# Patient Record
Sex: FEMALE | Race: WHITE | URBAN - NONMETROPOLITAN AREA
[De-identification: names, ages, dates, MRNs, and addresses within clinical notes are randomized per-mention and may not be internally consistent; named-entity substitution may affect disease eponyms.]

---

## 2020-07-16 ENCOUNTER — OFFICE VISIT (OUTPATIENT)
Dept: URBAN - NONMETROPOLITAN AREA CLINIC 4 | Facility: CLINIC | Age: 80
End: 2020-07-16

## 2020-08-03 ENCOUNTER — OFFICE VISIT (OUTPATIENT)
Dept: URBAN - METROPOLITAN AREA CLINIC 54 | Facility: CLINIC | Age: 80
End: 2020-08-03
Payer: COMMERCIAL

## 2020-08-03 ENCOUNTER — WEB ENCOUNTER (OUTPATIENT)
Dept: URBAN - METROPOLITAN AREA CLINIC 54 | Facility: CLINIC | Age: 80
End: 2020-08-03

## 2020-08-03 DIAGNOSIS — K21.9 GERD: ICD-10-CM

## 2020-08-03 DIAGNOSIS — Z86.010 PERSONAL HISTORY OF COLONIC POLYPS: ICD-10-CM

## 2020-08-03 DIAGNOSIS — R10.84 ABDOMINAL CRAMPING, GENERALIZED: ICD-10-CM

## 2020-08-03 PROBLEM — 83132003 UPPER ABDOMINAL PAIN: Status: ACTIVE | Noted: 2020-08-03

## 2020-08-03 PROCEDURE — 99214 OFFICE O/P EST MOD 30 MIN: CPT | Performed by: INTERNAL MEDICINE

## 2020-08-03 RX ORDER — ZOLPIDEM TARTRATE 5 MG/1
TABLET, FILM COATED ORAL
Qty: 0 | Refills: 0 | Status: ACTIVE | COMMUNITY
Start: 1900-01-01

## 2020-08-03 RX ORDER — CIMETIDINE 300 MG/1
1 TABLET WITH MEALS AND AT BEDTIME TABLET, FILM COATED ORAL TWICE A DAY
Qty: 60 | Refills: 2 | OUTPATIENT
Start: 2020-08-03

## 2020-08-03 NOTE — HPI-TODAY'S VISIT:
Pt reports that she has had trouble since March. For approx 3 weeks terrible indigestion and rumbling in chest. Chest discomfort with loss of 10# in 2 weeks.  Pt reports that she called the family doctor and was placed on pantoprazole.  Pt reports that she took otc omeprazole. Pt reports that this did seem to help but sx returns.  Pt report that she got another rx and started again. Pt reports that she stopped the meds d/t aching. Pt reports that she has regurg and water brash

## 2020-08-20 ENCOUNTER — TELEPHONE ENCOUNTER (OUTPATIENT)
Dept: URBAN - METROPOLITAN AREA CLINIC 54 | Facility: CLINIC | Age: 80
End: 2020-08-20

## 2020-08-21 ENCOUNTER — WEB ENCOUNTER (OUTPATIENT)
Dept: URBAN - NONMETROPOLITAN AREA CLINIC 4 | Facility: CLINIC | Age: 80
End: 2020-08-21

## 2020-08-24 ENCOUNTER — WEB ENCOUNTER (OUTPATIENT)
Dept: URBAN - METROPOLITAN AREA CLINIC 54 | Facility: CLINIC | Age: 80
End: 2020-08-24

## 2020-09-14 ENCOUNTER — OFFICE VISIT (OUTPATIENT)
Dept: URBAN - METROPOLITAN AREA CLINIC 54 | Facility: CLINIC | Age: 80
End: 2020-09-14
Payer: COMMERCIAL

## 2020-09-14 DIAGNOSIS — K63.5 COLON POLYPS: ICD-10-CM

## 2020-09-14 DIAGNOSIS — K21.9 GERD: ICD-10-CM

## 2020-09-14 DIAGNOSIS — R19.7 DIARRHEA: ICD-10-CM

## 2020-09-14 PROCEDURE — G8427 DOCREV CUR MEDS BY ELIG CLIN: HCPCS | Performed by: INTERNAL MEDICINE

## 2020-09-14 PROCEDURE — 1036F TOBACCO NON-USER: CPT | Performed by: INTERNAL MEDICINE

## 2020-09-14 PROCEDURE — 99214 OFFICE O/P EST MOD 30 MIN: CPT | Performed by: INTERNAL MEDICINE

## 2020-09-14 PROCEDURE — G9903 PT SCRN TBCO ID AS NON USER: HCPCS | Performed by: INTERNAL MEDICINE

## 2020-09-14 PROCEDURE — G8417 CALC BMI ABV UP PARAM F/U: HCPCS | Performed by: INTERNAL MEDICINE

## 2020-09-14 RX ORDER — COLESEVELAM HYDROCHLORIDE 625 MG/1
3 TABLETS WITH MEALS TABLET, COATED ORAL TWICE A DAY
Status: ACTIVE | COMMUNITY

## 2020-09-14 RX ORDER — CIMETIDINE 300 MG/1
1 TABLET WITH MEALS AND AT BEDTIME TABLET, FILM COATED ORAL TWICE A DAY
Qty: 60 | Refills: 2 | Status: ACTIVE | COMMUNITY
Start: 2020-08-03

## 2020-09-14 RX ORDER — NITROGLYCERIN 0.3 MG/1
AS DIRECTED PRN TABLET SUBLINGUAL ONCE A DAY
Qty: 30 | Refills: 2 | OUTPATIENT

## 2020-09-14 RX ORDER — ZOLPIDEM TARTRATE 5 MG/1
TABLET, FILM COATED ORAL
Qty: 0 | Refills: 0 | Status: DISCONTINUED | COMMUNITY
Start: 1900-01-01

## 2020-09-14 RX ORDER — SUCRALFATE 1 G
1 TABLET PRN TABLET ORAL TWICE A DAY
Qty: 60 TABLET | Refills: 1 | OUTPATIENT

## 2020-09-14 NOTE — HPI-TODAY'S VISIT:
Last visit Pt reports that she has had trouble since March. For approx 3 weeks terrible indigestion and rumbling in chest. Chest discomfort with loss of 10# in 2 weeks.  Pt reports that she called the family doctor and was placed on pantoprazole.  Pt reports that she took otc omeprazole. Pt reports that this did seem to help but sx returns.  Pt report that she got another rx and started again. Pt reports that she stopped the meds d/t aching. Pt reports that she has regurg and water brash  Currently Returns for f/u visit. Pt with severe dysphagia acute after HIDA scan. Painful ches. HIDA with normal EF and no reproduction of symptoms. States fear over recurrenr sx. Also with hx of crc and polyps. c/o nocturnal diarrhea. Neg stool studies in 2019. +tenesmus.

## 2020-09-25 ENCOUNTER — OFFICE VISIT (OUTPATIENT)
Dept: URBAN - METROPOLITAN AREA SURGERY CENTER 14 | Facility: SURGERY CENTER | Age: 80
End: 2020-09-25
Payer: COMMERCIAL

## 2020-09-25 ENCOUNTER — CLAIMS CREATED FROM THE CLAIM WINDOW (OUTPATIENT)
Dept: URBAN - METROPOLITAN AREA CLINIC 4 | Facility: CLINIC | Age: 80
End: 2020-09-25
Payer: COMMERCIAL

## 2020-09-25 DIAGNOSIS — D12.4 ADENOMA OF DESCENDING COLON: ICD-10-CM

## 2020-09-25 DIAGNOSIS — D12.4 BENIGN NEOPLASM OF DESCENDING COLON: ICD-10-CM

## 2020-09-25 DIAGNOSIS — Z86.010 H/O ADENOMATOUS POLYP OF COLON: ICD-10-CM

## 2020-09-25 DIAGNOSIS — K31.89 ACQUIRED DEFORMITY OF DUODENUM: ICD-10-CM

## 2020-09-25 DIAGNOSIS — D12.3 BENIGN NEOPLASM OF TRANSVERSE COLON: ICD-10-CM

## 2020-09-25 DIAGNOSIS — K63.89 OTHER SPECIFIED DISEASES OF INTESTINE: ICD-10-CM

## 2020-09-25 DIAGNOSIS — K29.60 OTHER GASTRITIS WITHOUT BLEEDING: ICD-10-CM

## 2020-09-25 DIAGNOSIS — R10.10 ABDOMINAL WALL PAIN IN RIGHT UPPER QUADRANT: ICD-10-CM

## 2020-09-25 DIAGNOSIS — Z85.038 H/O COLON CANCER, STAGE I: ICD-10-CM

## 2020-09-25 DIAGNOSIS — D12.3 ADENOMA OF TRANSVERSE COLON: ICD-10-CM

## 2020-09-25 PROCEDURE — G9936 PMH PLYP/NEO CO/RECT/JUN/ANS: HCPCS | Performed by: INTERNAL MEDICINE

## 2020-09-25 PROCEDURE — 88305 TISSUE EXAM BY PATHOLOGIST: CPT | Performed by: PATHOLOGY

## 2020-09-25 PROCEDURE — G8907 PT DOC NO EVENTS ON DISCHARG: HCPCS | Performed by: INTERNAL MEDICINE

## 2020-09-25 PROCEDURE — 45385 COLONOSCOPY W/LESION REMOVAL: CPT | Performed by: INTERNAL MEDICINE

## 2020-09-25 PROCEDURE — 88312 SPECIAL STAINS GROUP 1: CPT | Performed by: PATHOLOGY

## 2020-09-25 PROCEDURE — 43239 EGD BIOPSY SINGLE/MULTIPLE: CPT | Performed by: INTERNAL MEDICINE

## 2020-11-09 ENCOUNTER — TELEPHONE ENCOUNTER (OUTPATIENT)
Dept: URBAN - METROPOLITAN AREA CLINIC 54 | Facility: CLINIC | Age: 80
End: 2020-11-09

## 2020-11-10 ENCOUNTER — ERX REFILL RESPONSE (OUTPATIENT)
Dept: URBAN - METROPOLITAN AREA CLINIC 54 | Facility: CLINIC | Age: 80
End: 2020-11-10

## 2020-11-10 RX ORDER — CIMETIDINE 300 MG/1
1 TABLET WITH MEALS AND AT BEDTIME TABLET, FILM COATED ORAL TWICE A DAY
Qty: 180 | Refills: 2

## 2020-12-03 ENCOUNTER — OFFICE VISIT (OUTPATIENT)
Dept: URBAN - NONMETROPOLITAN AREA CLINIC 4 | Facility: CLINIC | Age: 80
End: 2020-12-03
Payer: COMMERCIAL

## 2020-12-03 DIAGNOSIS — K63.5 COLON POLYPS: ICD-10-CM

## 2020-12-03 DIAGNOSIS — R19.7 DIARRHEA: ICD-10-CM

## 2020-12-03 DIAGNOSIS — K21.9 GERD: ICD-10-CM

## 2020-12-03 PROCEDURE — G8417 CALC BMI ABV UP PARAM F/U: HCPCS | Performed by: INTERNAL MEDICINE

## 2020-12-03 PROCEDURE — 1036F TOBACCO NON-USER: CPT | Performed by: INTERNAL MEDICINE

## 2020-12-03 PROCEDURE — G9903 PT SCRN TBCO ID AS NON USER: HCPCS | Performed by: INTERNAL MEDICINE

## 2020-12-03 PROCEDURE — G8427 DOCREV CUR MEDS BY ELIG CLIN: HCPCS | Performed by: INTERNAL MEDICINE

## 2020-12-03 PROCEDURE — G8482 FLU IMMUNIZE ORDER/ADMIN: HCPCS | Performed by: INTERNAL MEDICINE

## 2020-12-03 PROCEDURE — 99213 OFFICE O/P EST LOW 20 MIN: CPT | Performed by: INTERNAL MEDICINE

## 2020-12-03 RX ORDER — SUCRALFATE 1 G
1 TABLET PRN TABLET ORAL TWICE A DAY
Qty: 60 TABLET | Refills: 1 | Status: ACTIVE | COMMUNITY

## 2020-12-03 RX ORDER — CIMETIDINE 300 MG/1
1 TABLET WITH MEALS AND AT BEDTIME TABLET, FILM COATED ORAL TWICE A DAY
Qty: 180 | Refills: 2 | Status: ACTIVE | COMMUNITY

## 2020-12-03 RX ORDER — COLESEVELAM HYDROCHLORIDE 625 MG/1
3 TABLETS WITH MEALS TABLET, COATED ORAL TWICE A DAY
Status: ACTIVE | COMMUNITY

## 2020-12-03 RX ORDER — BISMUTH SUBSALICYLATE 262 MG/1
2 TABLETS WITH MEALS AND AT BEDTIME TABLET, CHEWABLE ORAL
Qty: 240 TABLET | Refills: 1 | OUTPATIENT

## 2020-12-03 RX ORDER — NITROGLYCERIN 0.3 MG/1
AS DIRECTED PRN TABLET SUBLINGUAL ONCE A DAY
Qty: 30 | Refills: 2 | Status: ACTIVE | COMMUNITY

## 2020-12-03 NOTE — HPI-TODAY'S VISIT:
81-year-old female returns for follow-up visit.  The patient underwent a colonoscopy 9/25/2020.  This was due to a personal history of colon cancer.  She was found to have a 4 mm polyp in the rectum for sessile polyps in the descending colon 2 polyps in the descending colon for additional polyps in the transverse colon as well as pandiverticulosis.  He also underwent a upper endoscopy as she complained of upper abdominal pain and was found to have a J-shaped stomach with mild axial rotation a HIDA scan was performed for the upper abdominal discomfort and the gallbladder ejection fraction was 40% without reproduction of symptoms.  Pathology from the upper endoscopy was consistent with predominantly tubular adenomatous polyps with benign mucosal polyp in the rectum and additional hyperplastic polyps in the descending colon.  The patient reports that she has been doing well.  She reports occasional bowel incontinence but otherwise no new complaints.  Abdominal discomfort has improved.  Has some trouble with diarrhea and incontinence

## 2020-12-10 ENCOUNTER — ERX REFILL RESPONSE (OUTPATIENT)
Age: 80
End: 2020-12-10

## 2020-12-10 RX ORDER — COLESEVELAM HYDROCHLORIDE 625 MG/1
TAKE 3 TABLETS BY MOUTH TWO TIMES DAILY WITH MEALS AND  LIQUID TABLET, FILM COATED ORAL
Qty: 540 | Refills: 3

## 2021-02-04 ENCOUNTER — OFFICE VISIT (OUTPATIENT)
Dept: URBAN - NONMETROPOLITAN AREA CLINIC 4 | Facility: CLINIC | Age: 81
End: 2021-02-04
Payer: COMMERCIAL

## 2021-02-04 DIAGNOSIS — K63.5 COLON POLYPS: ICD-10-CM

## 2021-02-04 DIAGNOSIS — K58.9 IBS (IRRITABLE BOWEL SYNDROME) DIARRHEA PREDOMINANT: ICD-10-CM

## 2021-02-04 DIAGNOSIS — K21.9 GERD: ICD-10-CM

## 2021-02-04 DIAGNOSIS — Z85.038 PERSONAL HISTORY OF COLON CANCER: ICD-10-CM

## 2021-02-04 PROCEDURE — 99214 OFFICE O/P EST MOD 30 MIN: CPT | Performed by: INTERNAL MEDICINE

## 2021-02-04 PROCEDURE — G9903 PT SCRN TBCO ID AS NON USER: HCPCS | Performed by: INTERNAL MEDICINE

## 2021-02-04 PROCEDURE — G8483 FLU IMM NO ADMIN DOC REA: HCPCS | Performed by: INTERNAL MEDICINE

## 2021-02-04 PROCEDURE — 1036F TOBACCO NON-USER: CPT | Performed by: INTERNAL MEDICINE

## 2021-02-04 PROCEDURE — G8417 CALC BMI ABV UP PARAM F/U: HCPCS | Performed by: INTERNAL MEDICINE

## 2021-02-04 RX ORDER — BISMUTH SUBSALICYLATE 262 MG/1
2 TABLETS WITH MEALS AND AT BEDTIME TABLET, CHEWABLE ORAL
Qty: 240 TABLET | Refills: 1 | Status: ACTIVE | COMMUNITY

## 2021-02-04 RX ORDER — CIMETIDINE 300 MG/1
1 TABLET WITH MEALS AND AT BEDTIME TABLET, FILM COATED ORAL TWICE A DAY
Qty: 180 | Refills: 2 | Status: ACTIVE | COMMUNITY

## 2021-02-04 RX ORDER — SUCRALFATE 1 G
1 TABLET PRN TABLET ORAL TWICE A DAY
Qty: 60 TABLET | Refills: 1 | Status: ACTIVE | COMMUNITY

## 2021-02-04 RX ORDER — NITROGLYCERIN 0.3 MG/1
AS DIRECTED PRN TABLET SUBLINGUAL ONCE A DAY
Qty: 30 | Refills: 2 | Status: ACTIVE | COMMUNITY

## 2021-02-04 RX ORDER — COLESEVELAM HYDROCHLORIDE 625 MG/1
TAKE 3 TABLETS BY MOUTH TWO TIMES DAILY WITH MEALS AND  LIQUID TABLET, FILM COATED ORAL
Qty: 540 | Refills: 3 | Status: ACTIVE | COMMUNITY

## 2021-02-04 RX ORDER — DEXLANSOPRAZOLE 60 MG/1
1 CAPSULE CAPSULE, DELAYED RELEASE ORAL ONCE A DAY
Qty: 90 CAPSULE | Refills: 2 | OUTPATIENT

## 2021-02-04 RX ORDER — COLESEVELAM HYDROCHLORIDE 625 MG/1
3 TABLETS WITH MEALS TABLET, COATED ORAL TWICE A DAY
Status: ACTIVE | COMMUNITY

## 2021-02-04 NOTE — HPI-TODAY'S VISIT:
Pt reports that she is having some trouble with sudden diarrhea.  Pt reports that she is taking the welchol.  Pt report sthat she is taking 2 per day.  Pt reports that she will have 2- 3 formed stools.  Then has suddent onset diarrhea.  Pt reprot that taking the dexilant that did not have reflux or diarrhea.

## 2021-04-22 ENCOUNTER — TELEPHONE ENCOUNTER (OUTPATIENT)
Dept: URBAN - METROPOLITAN AREA CLINIC 92 | Facility: CLINIC | Age: 81
End: 2021-04-22

## 2021-04-22 RX ORDER — DEXLANSOPRAZOLE 60 MG/1
1 CAPSULE CAPSULE, DELAYED RELEASE ORAL ONCE A DAY
Qty: 90 CAPSULE | Refills: 2

## 2021-10-22 ENCOUNTER — TELEPHONE ENCOUNTER (OUTPATIENT)
Dept: URBAN - METROPOLITAN AREA CLINIC 92 | Facility: CLINIC | Age: 81
End: 2021-10-22

## 2021-10-22 RX ORDER — OMEPRAZOLE 20 MG/1
1 CAPSULE 30 MINUTES BEFORE MORNING MEAL CAPSULE, DELAYED RELEASE ORAL ONCE A DAY
Qty: 30 | Refills: 6 | OUTPATIENT

## 2021-10-28 ENCOUNTER — WEB ENCOUNTER (OUTPATIENT)
Dept: URBAN - METROPOLITAN AREA CLINIC 54 | Facility: CLINIC | Age: 81
End: 2021-10-28

## 2021-10-28 ENCOUNTER — OFFICE VISIT (OUTPATIENT)
Dept: URBAN - METROPOLITAN AREA CLINIC 54 | Facility: CLINIC | Age: 81
End: 2021-10-28
Payer: COMMERCIAL

## 2021-10-28 VITALS
DIASTOLIC BLOOD PRESSURE: 84 MMHG | SYSTOLIC BLOOD PRESSURE: 169 MMHG | HEIGHT: 66 IN | BODY MASS INDEX: 25.07 KG/M2 | TEMPERATURE: 97.9 F | WEIGHT: 156 LBS | HEART RATE: 83 BPM

## 2021-10-28 DIAGNOSIS — K63.5 COLON POLYPS: ICD-10-CM

## 2021-10-28 DIAGNOSIS — Z85.038 PERSONAL HISTORY OF COLON CANCER: ICD-10-CM

## 2021-10-28 DIAGNOSIS — K58.9 IBS (IRRITABLE BOWEL SYNDROME) DIARRHEA PREDOMINANT: ICD-10-CM

## 2021-10-28 DIAGNOSIS — R11.0 NAUSEA: ICD-10-CM

## 2021-10-28 DIAGNOSIS — R10.32 LLQ PAIN: ICD-10-CM

## 2021-10-28 DIAGNOSIS — K21.9 GERD: ICD-10-CM

## 2021-10-28 PROCEDURE — 99214 OFFICE O/P EST MOD 30 MIN: CPT | Performed by: REGISTERED NURSE

## 2021-10-28 RX ORDER — ONDANSETRON 4 MG/1
1 TABLET ON THE TONGUE AND ALLOW TO DISSOLVE TABLET, ORALLY DISINTEGRATING ORAL ONCE A DAY
Status: ACTIVE | COMMUNITY

## 2021-10-28 RX ORDER — OMEPRAZOLE 20 MG/1
1 CAPSULE 30 MINUTES BEFORE MORNING MEAL CAPSULE, DELAYED RELEASE ORAL ONCE A DAY
Qty: 30 | Refills: 6 | Status: ACTIVE | COMMUNITY

## 2021-10-28 RX ORDER — NITROGLYCERIN 0.3 MG/1
AS DIRECTED PRN TABLET SUBLINGUAL ONCE A DAY
Qty: 30 | Refills: 2 | Status: ON HOLD | COMMUNITY

## 2021-10-28 RX ORDER — COLESEVELAM HYDROCHLORIDE 625 MG/1
3 TABLETS WITH MEALS TABLET, COATED ORAL TWICE A DAY
Status: ACTIVE | COMMUNITY

## 2021-10-28 RX ORDER — SUCRALFATE 1 G
1 TABLET PRN TABLET ORAL TWICE A DAY
Qty: 60 TABLET | Refills: 1 | Status: ON HOLD | COMMUNITY

## 2021-10-28 RX ORDER — PANTOPRAZOLE SODIUM 40 MG/1
1 TABLET TABLET, DELAYED RELEASE ORAL ONCE A DAY
Qty: 30 | Refills: 2 | OUTPATIENT
Start: 2021-10-28

## 2021-10-28 RX ORDER — COLESEVELAM HYDROCHLORIDE 625 MG/1
TAKE 3 TABLETS BY MOUTH TWO TIMES DAILY WITH MEALS AND  LIQUID TABLET, FILM COATED ORAL
Qty: 540 | Refills: 3 | Status: DISCONTINUED | COMMUNITY

## 2021-10-28 RX ORDER — ONDANSETRON 4 MG/1
1 TABLET ON THE TONGUE AND ALLOW TO DISSOLVE TABLET, ORALLY DISINTEGRATING ORAL EVERY 8 HOURS
Qty: 60 | Refills: 1

## 2021-10-28 RX ORDER — DEXLANSOPRAZOLE 60 MG/1
1 CAPSULE CAPSULE, DELAYED RELEASE ORAL ONCE A DAY
Qty: 90 CAPSULE | Refills: 2 | Status: ON HOLD | COMMUNITY

## 2021-10-28 RX ORDER — CIMETIDINE 300 MG/1
1 TABLET WITH MEALS AND AT BEDTIME TABLET, FILM COATED ORAL TWICE A DAY
Qty: 180 | Refills: 2 | Status: DISCONTINUED | COMMUNITY

## 2021-10-28 RX ORDER — BISMUTH SUBSALICYLATE 262 MG/1
2 TABLETS WITH MEALS AND AT BEDTIME TABLET, CHEWABLE ORAL
Qty: 240 TABLET | Refills: 1 | Status: ON HOLD | COMMUNITY

## 2021-10-28 NOTE — PHYSICAL EXAM GASTROINTESTINAL
Abdomen , soft, mild TTP in LLQ, nondistended , no guarding or rigidity , no masses palpable , normal bowel sounds , Liver and Spleen , no hepatomegaly present , no hepatosplenomegaly , liver nontender , spleen not palpable

## 2021-10-28 NOTE — HPI-TODAY'S VISIT:
Pt reports that she is having some trouble with sudden diarrhea.  Pt reports that she is taking the welchol.  Pt report sthat she is taking 2 per day.  Pt reports that she will have 2- 3 formed stools.  Then has suddent onset diarrhea.  Pt reprot that taking the dexilant that did not have reflux or diarrhea.  10/28/21: Pt RTC with c/o persistent  acid reflux and nausea over past couple of weeks. Mostly occurs in mornings No improvement with omeprazole. Last Wednesday, she had LLQ pain and diarrhea. Seen by PCP this past Monday and was prescribed Cipro/Flagyl for suspected diverticulitis. Denies any further diarrhea. No hematochezia. She has poor appetite and has lost 9 lbs in past week. Takes Zofran prn, but needs refill.

## 2021-11-04 ENCOUNTER — WEB ENCOUNTER (OUTPATIENT)
Dept: URBAN - NONMETROPOLITAN AREA CLINIC 4 | Facility: CLINIC | Age: 81
End: 2021-11-04

## 2021-11-04 ENCOUNTER — OFFICE VISIT (OUTPATIENT)
Dept: URBAN - NONMETROPOLITAN AREA CLINIC 4 | Facility: CLINIC | Age: 81
End: 2021-11-04
Payer: COMMERCIAL

## 2021-11-04 DIAGNOSIS — K86.2 PANCREATIC CYST: ICD-10-CM

## 2021-11-04 DIAGNOSIS — R10.32 LLQ ABDOMINAL PAIN: ICD-10-CM

## 2021-11-04 DIAGNOSIS — R19.4 CHANGE IN BOWEL HABITS: ICD-10-CM

## 2021-11-04 DIAGNOSIS — M54.9 UPPER BACK PAIN: ICD-10-CM

## 2021-11-04 DIAGNOSIS — R11.0 NAUSEA: ICD-10-CM

## 2021-11-04 PROCEDURE — 99215 OFFICE O/P EST HI 40 MIN: CPT | Performed by: INTERNAL MEDICINE

## 2021-11-04 RX ORDER — NITROGLYCERIN 0.3 MG/1
AS DIRECTED PRN TABLET SUBLINGUAL ONCE A DAY
Qty: 30 | Refills: 2 | Status: ACTIVE | COMMUNITY

## 2021-11-04 RX ORDER — ONDANSETRON 4 MG/1
1 TABLET ON THE TONGUE AND ALLOW TO DISSOLVE TABLET, ORALLY DISINTEGRATING ORAL EVERY 8 HOURS
Qty: 60 | Refills: 1 | Status: DISCONTINUED | COMMUNITY

## 2021-11-04 RX ORDER — BISMUTH SUBSALICYLATE 262 MG/1
2 TABLETS WITH MEALS AND AT BEDTIME TABLET, CHEWABLE ORAL
Qty: 240 TABLET | Refills: 1 | Status: ACTIVE | COMMUNITY

## 2021-11-04 RX ORDER — OMEPRAZOLE 20 MG/1
1 CAPSULE 30 MINUTES BEFORE MORNING MEAL CAPSULE, DELAYED RELEASE ORAL ONCE A DAY
Qty: 30 | Refills: 6 | Status: DISCONTINUED | COMMUNITY

## 2021-11-04 RX ORDER — DEXLANSOPRAZOLE 60 MG/1
1 CAPSULE CAPSULE, DELAYED RELEASE ORAL ONCE A DAY
Qty: 90 CAPSULE | Refills: 2 | Status: DISCONTINUED | COMMUNITY

## 2021-11-04 RX ORDER — COLESEVELAM HYDROCHLORIDE 625 MG/1
3 TABLETS WITH MEALS TABLET, COATED ORAL TWICE A DAY
Status: ACTIVE | COMMUNITY

## 2021-11-04 RX ORDER — PROMETHAZINE HYDROCHLORIDE 12.5 MG/1
1 TABLET AS NEEDED TABLET ORAL
Status: ACTIVE | COMMUNITY

## 2021-11-04 RX ORDER — SUCRALFATE 1 G
1 TABLET PRN TABLET ORAL TWICE A DAY
Qty: 60 TABLET | Refills: 1 | Status: ACTIVE | COMMUNITY

## 2021-11-04 RX ORDER — PANTOPRAZOLE SODIUM 40 MG/1
1 TABLET TABLET, DELAYED RELEASE ORAL ONCE A DAY
Qty: 30 | Refills: 2 | Status: ACTIVE | COMMUNITY

## 2021-11-04 NOTE — HPI-TODAY'S VISIT:
Pt reports that she is having some trouble with sudden diarrhea.  Pt reports that she is taking the welchol.  Pt report sthat she is taking 2 per day.  Pt reports that she will have 2- 3 formed stools.  Then has suddent onset diarrhea.  Pt reprot that taking the dexilant that did not have reflux or diarrhea.  10/28/21: Pt RTC with c/o persistent  acid reflux and nausea over past couple of weeks. Mostly occurs in mornings No improvement with omeprazole. Last Wednesday, she had LLQ pain and diarrhea. Seen by PCP this past Monday and was prescribed Cipro/Flagyl for suspected diverticulitis. Denies any further diarrhea. No hematochezia. She has poor appetite and has lost 9 lbs in past week. Takes Zofran prn, but needs refill.  11-4-21 Patient returns for f/u visit. Pt reports that she was seen in ED with abdoinal pain. Hx of mult colon polyps. Last c scope in 2020. hgb was normal. 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 Pt reports that a few weeks ago she started that she would have reflux. Pt reports that when she would eat she would have regurgitation. Pt reports that 2 weeks ago had constant nausea. Trying to eat food was terrible. Pt with pain in the LLQ region. Pt reports that she tried to take her nausea medications which did not help. Had diarrhea to start with. Pt reports urgent gas and flatulence. Had increased belching. Has had mucousy stools. Yellow and oily stools.

## 2021-11-15 ENCOUNTER — OFFICE VISIT (OUTPATIENT)
Dept: URBAN - METROPOLITAN AREA MEDICAL CENTER 48 | Facility: MEDICAL CENTER | Age: 81
End: 2021-11-15

## 2021-11-15 LAB
ADENOVIRUS F 40/41: NOT DETECTED
ASTROVIRUS: NOT DETECTED
C DIFFICILE TOXIN A/B: NOT DETECTED
CAMPYLOBACTER: NOT DETECTED
CRYPTOSPORIDIUM: NOT DETECTED
CYCLOSPORA CAYETANENSIS: NOT DETECTED
E COLI O157: (no result)
ENTAMOEBA HISTOLYTICA: NOT DETECTED
ENTEROAGGREGATIVE E COLI: NOT DETECTED
ENTEROPATHOGENIC E COLI: NOT DETECTED
ENTEROTOXIGENIC E COLI: NOT DETECTED
GIARDIA LAMBLIA: NOT DETECTED
Lab: (no result)
Lab: NORMAL
NOROVIRUS GI/GII: NOT DETECTED
PANCREATIC ELASTASE, FECAL: >500
PLESIOMONAS SHIGELLOIDES: NOT DETECTED
ROTAVIRUS A: NOT DETECTED
SALMONELLA: NOT DETECTED
SAPOVIRUS: NOT DETECTED
SHIGA-TOXIN-PRODUCING E COLI: NOT DETECTED
SHIGELLA/ENTEROINVASIVE E COLI: NOT DETECTED
VIBRIO CHOLERAE: NOT DETECTED
VIBRIO: NOT DETECTED
WHITE BLOOD CELLS (WBC), STOOL: (no result)
YERSINIA ENTEROCOLITICA: NOT DETECTED

## 2021-11-16 ENCOUNTER — OFFICE VISIT (OUTPATIENT)
Dept: URBAN - METROPOLITAN AREA MEDICAL CENTER 23 | Facility: MEDICAL CENTER | Age: 81
End: 2021-11-16
Payer: COMMERCIAL

## 2021-11-16 DIAGNOSIS — R93.3 ABN FINDINGS-GI TRACT: ICD-10-CM

## 2021-11-16 DIAGNOSIS — K31.89 ACQUIRED DEFORMITY OF DUODENUM: ICD-10-CM

## 2021-11-16 DIAGNOSIS — K63.5 BENIGN COLON POLYP: ICD-10-CM

## 2021-11-16 DIAGNOSIS — K21.9 ACID REFLUX: ICD-10-CM

## 2021-11-16 DIAGNOSIS — B37.81 CANDIDA: ICD-10-CM

## 2021-11-16 DIAGNOSIS — D12.3 ADENOMA OF TRANSVERSE COLON: ICD-10-CM

## 2021-11-16 DIAGNOSIS — K86.89 ACUTE PANCREATIC FLUID COLLECTION: ICD-10-CM

## 2021-11-16 DIAGNOSIS — R10.84 ABDOMINAL CRAMPING, GENERALIZED: ICD-10-CM

## 2021-11-16 DIAGNOSIS — Z85.048 HISTORY OF ANAL CANCER: ICD-10-CM

## 2021-11-16 PROCEDURE — 43242 EGD US FINE NEEDLE BX/ASPIR: CPT | Performed by: INTERNAL MEDICINE

## 2021-11-16 PROCEDURE — 43239 EGD BIOPSY SINGLE/MULTIPLE: CPT | Performed by: INTERNAL MEDICINE

## 2021-11-16 PROCEDURE — 45385 COLONOSCOPY W/LESION REMOVAL: CPT | Performed by: INTERNAL MEDICINE

## 2021-11-16 PROCEDURE — 45380 COLONOSCOPY AND BIOPSY: CPT | Performed by: INTERNAL MEDICINE

## 2021-11-18 ENCOUNTER — TELEPHONE ENCOUNTER (OUTPATIENT)
Dept: URBAN - METROPOLITAN AREA CLINIC 92 | Facility: CLINIC | Age: 81
End: 2021-11-18

## 2021-11-18 RX ORDER — PANTOPRAZOLE SODIUM 40 MG/1
1 TABLET TABLET, DELAYED RELEASE ORAL ONCE A DAY
Qty: 30 | Refills: 2 | Status: ACTIVE | COMMUNITY

## 2021-11-18 RX ORDER — NITROGLYCERIN 0.3 MG/1
AS DIRECTED PRN TABLET SUBLINGUAL ONCE A DAY
Qty: 30 | Refills: 2 | Status: ACTIVE | COMMUNITY

## 2021-11-18 RX ORDER — COLESEVELAM HYDROCHLORIDE 625 MG/1
3 TABLETS WITH MEALS TABLET, COATED ORAL TWICE A DAY
Status: ACTIVE | COMMUNITY

## 2021-11-18 RX ORDER — SUCRALFATE 1 G
1 TABLET PRN TABLET ORAL TWICE A DAY
Qty: 60 TABLET | Refills: 1 | Status: ACTIVE | COMMUNITY

## 2021-11-18 RX ORDER — PROMETHAZINE HYDROCHLORIDE 12.5 MG/1
1 TABLET AS NEEDED TABLET ORAL
Status: ACTIVE | COMMUNITY

## 2021-11-18 RX ORDER — FLUCONAZOLE 100 MG/1
1 TABLET TABLET ORAL DAILY
Qty: 15 TABLET | Refills: 0 | OUTPATIENT
Start: 2021-11-18 | End: 2021-12-02

## 2021-11-18 RX ORDER — BISMUTH SUBSALICYLATE 262 MG/1
2 TABLETS WITH MEALS AND AT BEDTIME TABLET, CHEWABLE ORAL
Qty: 240 TABLET | Refills: 1 | Status: ACTIVE | COMMUNITY

## 2021-11-23 ENCOUNTER — TELEPHONE ENCOUNTER (OUTPATIENT)
Dept: URBAN - METROPOLITAN AREA CLINIC 54 | Facility: CLINIC | Age: 81
End: 2021-11-23

## 2021-11-23 RX ORDER — NYSTATIN 100000 [USP'U]/ML
4 ML SUSPENSION ORAL
Qty: 112 ML | Refills: 0 | OUTPATIENT

## 2021-12-03 ENCOUNTER — TELEPHONE ENCOUNTER (OUTPATIENT)
Dept: URBAN - METROPOLITAN AREA CLINIC 54 | Facility: CLINIC | Age: 81
End: 2021-12-03

## 2021-12-06 ENCOUNTER — TELEPHONE ENCOUNTER (OUTPATIENT)
Dept: URBAN - METROPOLITAN AREA CLINIC 54 | Facility: CLINIC | Age: 81
End: 2021-12-06

## 2021-12-07 ENCOUNTER — OFFICE VISIT (OUTPATIENT)
Dept: URBAN - NONMETROPOLITAN AREA CLINIC 4 | Facility: CLINIC | Age: 81
End: 2021-12-07

## 2021-12-07 RX ORDER — BISMUTH SUBSALICYLATE 262 MG/1
2 TABLETS WITH MEALS AND AT BEDTIME TABLET, CHEWABLE ORAL
Qty: 240 TABLET | Refills: 1 | COMMUNITY

## 2021-12-07 RX ORDER — PANTOPRAZOLE SODIUM 40 MG/1
1 TABLET TABLET, DELAYED RELEASE ORAL ONCE A DAY
Qty: 30 | Refills: 2 | COMMUNITY

## 2021-12-07 RX ORDER — PROMETHAZINE HYDROCHLORIDE 12.5 MG/1
1 TABLET AS NEEDED TABLET ORAL
COMMUNITY

## 2021-12-07 RX ORDER — NITROGLYCERIN 0.3 MG/1
AS DIRECTED PRN TABLET SUBLINGUAL ONCE A DAY
Qty: 30 | Refills: 2 | COMMUNITY

## 2021-12-07 RX ORDER — NYSTATIN 100000 [USP'U]/ML
4 ML SUSPENSION ORAL
Qty: 112 ML | Refills: 0 | COMMUNITY

## 2021-12-07 RX ORDER — SUCRALFATE 1 G
1 TABLET PRN TABLET ORAL TWICE A DAY
Qty: 60 TABLET | Refills: 1 | COMMUNITY

## 2021-12-07 RX ORDER — COLESEVELAM HYDROCHLORIDE 625 MG/1
3 TABLETS WITH MEALS TABLET, COATED ORAL TWICE A DAY
COMMUNITY

## 2021-12-10 ENCOUNTER — TELEPHONE ENCOUNTER (OUTPATIENT)
Dept: URBAN - METROPOLITAN AREA CLINIC 54 | Facility: CLINIC | Age: 81
End: 2021-12-10

## 2021-12-23 ENCOUNTER — OFFICE VISIT (OUTPATIENT)
Dept: URBAN - NONMETROPOLITAN AREA CLINIC 4 | Facility: CLINIC | Age: 81
End: 2021-12-23
Payer: COMMERCIAL

## 2021-12-23 ENCOUNTER — WEB ENCOUNTER (OUTPATIENT)
Dept: URBAN - NONMETROPOLITAN AREA CLINIC 4 | Facility: CLINIC | Age: 81
End: 2021-12-23

## 2021-12-23 DIAGNOSIS — K63.5 COLON POLYPS: ICD-10-CM

## 2021-12-23 DIAGNOSIS — K58.9 IBS (IRRITABLE BOWEL SYNDROME) DIARRHEA PREDOMINANT: ICD-10-CM

## 2021-12-23 DIAGNOSIS — K21.9 GERD: ICD-10-CM

## 2021-12-23 DIAGNOSIS — E55.9 VITAMIN D DEFICIENCY: ICD-10-CM

## 2021-12-23 DIAGNOSIS — R10.13 DYSPEPSIA: ICD-10-CM

## 2021-12-23 DIAGNOSIS — D49.0 IPMN (INTRADUCTAL PAPILLARY MUCINOUS NEOPLASM): ICD-10-CM

## 2021-12-23 PROBLEM — 34713006: Status: ACTIVE | Noted: 2021-12-23

## 2021-12-23 PROCEDURE — 99214 OFFICE O/P EST MOD 30 MIN: CPT | Performed by: INTERNAL MEDICINE

## 2021-12-23 RX ORDER — PANTOPRAZOLE SODIUM 40 MG/1
1 TABLET TABLET, DELAYED RELEASE ORAL ONCE A DAY
Qty: 90 | Refills: 2

## 2021-12-23 RX ORDER — PROMETHAZINE HYDROCHLORIDE 12.5 MG/1
1 TABLET AS NEEDED TABLET ORAL
Status: ACTIVE | COMMUNITY

## 2021-12-23 RX ORDER — COLESEVELAM HYDROCHLORIDE 625 MG/1
3 TABLETS WITH MEALS TABLET, COATED ORAL TWICE A DAY
Qty: 540 | Refills: 3

## 2021-12-23 RX ORDER — NYSTATIN 100000 [USP'U]/ML
4 ML SUSPENSION ORAL
Qty: 112 ML | Refills: 0 | Status: DISCONTINUED | COMMUNITY

## 2021-12-23 RX ORDER — SUCRALFATE 1 G
1 TABLET PRN TABLET ORAL TWICE A DAY
Qty: 60 TABLET | Refills: 1 | Status: DISCONTINUED | COMMUNITY

## 2021-12-23 RX ORDER — COLESEVELAM HYDROCHLORIDE 625 MG/1
3 TABLETS WITH MEALS TABLET, COATED ORAL TWICE A DAY
Status: ACTIVE | COMMUNITY

## 2021-12-23 RX ORDER — DICLOFENAC SODIUM 75 MG/1
1 TABLET AS NEEDED TABLET, DELAYED RELEASE ORAL TWICE A DAY
Status: ACTIVE | COMMUNITY

## 2021-12-23 RX ORDER — ZOLPIDEM TARTRATE 5 MG/1
1 TABLET AT BEDTIME TABLET, FILM COATED ORAL ONCE A DAY
Status: ACTIVE | COMMUNITY

## 2021-12-23 RX ORDER — GABAPENTIN 100 MG/1
2 CAPSULE ORAL BID
Status: ACTIVE | COMMUNITY

## 2021-12-23 RX ORDER — DICYCLOMINE HYDROCHLORIDE 10 MG/1
1 CAPSULE ORAL BID
Status: ACTIVE | COMMUNITY

## 2021-12-23 RX ORDER — ERGOCALCIFEROL 1.25 MG/1
1 CAPSULE CAPSULE, LIQUID FILLED ORAL
Status: ACTIVE | COMMUNITY

## 2021-12-23 RX ORDER — BACILLUS COAGULANS/INULIN 1B-250 MG
AS DIRECTED CAPSULE ORAL
Status: ACTIVE | COMMUNITY

## 2021-12-23 RX ORDER — BISMUTH SUBSALICYLATE 262 MG/1
2 TABLETS WITH MEALS AND AT BEDTIME TABLET, CHEWABLE ORAL
Qty: 240 TABLET | Refills: 1 | Status: DISCONTINUED | COMMUNITY

## 2021-12-23 RX ORDER — PANTOPRAZOLE SODIUM 40 MG/1
1 TABLET TABLET, DELAYED RELEASE ORAL ONCE A DAY
Qty: 30 | Refills: 2 | Status: ACTIVE | COMMUNITY

## 2021-12-23 RX ORDER — DICYCLOMINE HYDROCHLORIDE 10 MG/1
TAKE 1 TABLET BY MOUTH THREE TIMES DAILY AS NEEDED FOR 30 DAYS CAPSULE ORAL THREE TIMES A DAY
Qty: 30 | Refills: 3

## 2021-12-23 RX ORDER — NITROGLYCERIN 0.3 MG/1
AS DIRECTED PRN TABLET SUBLINGUAL ONCE A DAY
Qty: 30 | Refills: 2 | Status: DISCONTINUED | COMMUNITY

## 2021-12-23 NOTE — HPI-TODAY'S VISIT:
Pt reports that she is having some trouble with sudden diarrhea.  Pt reports that she is taking the welchol.  Pt report sthat she is taking 2 per day.  Pt reports that she will have 2- 3 formed stools.  Then has suddent onset diarrhea.  Pt reprot that taking the dexilant that did not have reflux or diarrhea.  10/28/21: Pt RTC with c/o persistent  acid reflux and nausea over past couple of weeks. Mostly occurs in mornings No improvement with omeprazole. Last Wednesday, she had LLQ pain and diarrhea. Seen by PCP this past Monday and was prescribed Cipro/Flagyl for suspected diverticulitis. Denies any further diarrhea. No hematochezia. She has poor appetite and has lost 9 lbs in past week. Takes Zofran prn, but needs refill.  11-4-21 Patient returns for f/u visit. Pt reports that she was seen in ED with abdoinal pain. Hx of mult colon polyps. Last c scope in 2020. hgb was normal. 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 Pt reports that a few weeks ago she started that she would have reflux. Pt reports that when she would eat she would have regurgitation. Pt reports that 2 weeks ago had constant nausea. Trying to eat food was terrible. Pt with pain in the LLQ region. Pt reports that she tried to take her nausea medications which did not help. Had diarrhea to start with. Pt reports urgent gas and flatulence. Had increased belching. Has had mucousy stools. Yellow and oily stools.  12-23-21 Pt reports having her physical with her pcp and voiced concern of nausea.  Pt reports that she took the first medication and felt sicker  Pt reports that she then took the nystatin.  Pt reports that she swallowed.  Patient reports that she is feeling better.   Pt reports that she went to Nutritionist and was recommended probiotic. Pt reports that she was given silver as well.  EUS with IPMN benign 1 year MRI recommended, low CEA. Colon with TA polyp.  Pt reports that she is taking diclofenac and has nausea.  Pt reports that she wanted to ask about taking welchol and imodium which has worked which has controlled the diarrhea. c/o inguinal pulsation and numbness (advised to speak to pcp)

## 2022-01-04 ENCOUNTER — OFFICE VISIT (OUTPATIENT)
Dept: URBAN - NONMETROPOLITAN AREA CLINIC 4 | Facility: CLINIC | Age: 82
End: 2022-01-04

## 2022-04-08 ENCOUNTER — TELEPHONE ENCOUNTER (OUTPATIENT)
Dept: URBAN - METROPOLITAN AREA CLINIC 54 | Facility: CLINIC | Age: 82
End: 2022-04-08

## 2022-04-08 RX ORDER — ZOLPIDEM TARTRATE 5 MG/1
1 TABLET AT BEDTIME TABLET, FILM COATED ORAL ONCE A DAY
Status: ACTIVE | COMMUNITY

## 2022-04-08 RX ORDER — PROMETHAZINE HYDROCHLORIDE 12.5 MG/1
1 TABLET AS NEEDED TABLET ORAL
Status: ACTIVE | COMMUNITY

## 2022-04-08 RX ORDER — NYSTATIN 100000 [USP'U]/ML
4 ML SUSPENSION ORAL
Qty: 480 | OUTPATIENT
Start: 2022-04-14 | End: 2022-05-14

## 2022-04-08 RX ORDER — DICYCLOMINE HYDROCHLORIDE 10 MG/1
TAKE 1 TABLET BY MOUTH THREE TIMES DAILY AS NEEDED FOR 30 DAYS CAPSULE ORAL THREE TIMES A DAY
Qty: 30 | Refills: 3 | Status: ACTIVE | COMMUNITY

## 2022-04-08 RX ORDER — ERGOCALCIFEROL 1.25 MG/1
1 CAPSULE CAPSULE, LIQUID FILLED ORAL
Status: ACTIVE | COMMUNITY

## 2022-04-08 RX ORDER — DICLOFENAC SODIUM 75 MG/1
1 TABLET AS NEEDED TABLET, DELAYED RELEASE ORAL TWICE A DAY
Status: ACTIVE | COMMUNITY

## 2022-04-08 RX ORDER — COLESEVELAM HYDROCHLORIDE 625 MG/1
3 TABLETS WITH MEALS TABLET, COATED ORAL TWICE A DAY
Qty: 540 | Refills: 3 | Status: ACTIVE | COMMUNITY

## 2022-04-08 RX ORDER — GABAPENTIN 100 MG/1
2 CAPSULE ORAL BID
Status: ACTIVE | COMMUNITY

## 2022-04-08 RX ORDER — BACILLUS COAGULANS/INULIN 1B-250 MG
AS DIRECTED CAPSULE ORAL
Status: ACTIVE | COMMUNITY

## 2022-04-08 RX ORDER — PANTOPRAZOLE SODIUM 40 MG/1
1 TABLET TABLET, DELAYED RELEASE ORAL ONCE A DAY
Qty: 90 | Refills: 2 | Status: ACTIVE | COMMUNITY

## 2022-05-12 ENCOUNTER — WEB ENCOUNTER (OUTPATIENT)
Dept: URBAN - NONMETROPOLITAN AREA CLINIC 4 | Facility: CLINIC | Age: 82
End: 2022-05-12

## 2022-05-12 ENCOUNTER — TELEPHONE ENCOUNTER (OUTPATIENT)
Dept: URBAN - NONMETROPOLITAN AREA CLINIC 4 | Facility: CLINIC | Age: 82
End: 2022-05-12

## 2022-05-12 RX ORDER — SUCRALFATE 1 G/1
1 TABLET ON AN EMPTY STOMACH TABLET ORAL TWICE A DAY
Qty: 60 | OUTPATIENT
Start: 2022-05-13 | End: 2022-06-12

## 2022-08-23 ENCOUNTER — TELEPHONE ENCOUNTER (OUTPATIENT)
Dept: URBAN - NONMETROPOLITAN AREA CLINIC 4 | Facility: CLINIC | Age: 82
End: 2022-08-23

## 2022-08-23 RX ORDER — SUCRALFATE 1 G/1
1 TABLET ON AN EMPTY STOMACH TABLET ORAL TWICE A DAY
Qty: 60
Start: 2022-05-13 | End: 2022-09-22

## 2022-10-31 ENCOUNTER — TELEPHONE ENCOUNTER (OUTPATIENT)
Dept: URBAN - NONMETROPOLITAN AREA CLINIC 4 | Facility: CLINIC | Age: 82
End: 2022-10-31

## 2023-08-29 ENCOUNTER — TELEPHONE ENCOUNTER (OUTPATIENT)
Dept: URBAN - METROPOLITAN AREA CLINIC 54 | Facility: CLINIC | Age: 83
End: 2023-08-29

## 2023-08-29 RX ORDER — COLESEVELAM HYDROCHLORIDE 625 MG/1
3 TABLETS WITH MEALS TABLET, COATED ORAL TWICE A DAY
Qty: 540 | Refills: 3

## 2023-10-19 ENCOUNTER — OFFICE VISIT (OUTPATIENT)
Dept: URBAN - NONMETROPOLITAN AREA CLINIC 4 | Facility: CLINIC | Age: 83
End: 2023-10-19

## 2024-01-12 ENCOUNTER — OFFICE VISIT (OUTPATIENT)
Dept: URBAN - NONMETROPOLITAN AREA CLINIC 4 | Facility: CLINIC | Age: 84
End: 2024-01-12
Payer: COMMERCIAL

## 2024-01-12 VITALS
TEMPERATURE: 97.1 F | WEIGHT: 158 LBS | BODY MASS INDEX: 25.39 KG/M2 | SYSTOLIC BLOOD PRESSURE: 139 MMHG | HEART RATE: 80 BPM | DIASTOLIC BLOOD PRESSURE: 73 MMHG | HEIGHT: 66 IN

## 2024-01-12 DIAGNOSIS — K58.0 IRRITABLE BOWEL SYNDROME WITH DIARRHEA: ICD-10-CM

## 2024-01-12 PROCEDURE — 99213 OFFICE O/P EST LOW 20 MIN: CPT | Performed by: PHYSICIAN ASSISTANT

## 2024-01-12 RX ORDER — PANTOPRAZOLE SODIUM 40 MG/1
1 TABLET TABLET, DELAYED RELEASE ORAL ONCE A DAY
Qty: 90 | Refills: 2 | Status: ACTIVE | COMMUNITY

## 2024-01-12 RX ORDER — BACILLUS COAGULANS/INULIN 1B-250 MG
AS DIRECTED CAPSULE ORAL
Status: DISCONTINUED | COMMUNITY

## 2024-01-12 RX ORDER — ERGOCALCIFEROL 1.25 MG/1
1 CAPSULE CAPSULE, LIQUID FILLED ORAL
Status: ACTIVE | COMMUNITY

## 2024-01-12 RX ORDER — RIFAXIMIN 550 MG/1
1 TABLET TABLET ORAL THREE TIMES A DAY
Qty: 42 TABLET | Refills: 0 | OUTPATIENT
Start: 2024-01-12 | End: 2024-01-26

## 2024-01-12 RX ORDER — ZOLPIDEM TARTRATE 5 MG/1
1 TABLET AT BEDTIME TABLET, FILM COATED ORAL ONCE A DAY
Status: DISCONTINUED | COMMUNITY

## 2024-01-12 RX ORDER — DICYCLOMINE HYDROCHLORIDE 20 MG/1
1 TABLET TABLET ORAL THREE TIMES A DAY
Qty: 90 | OUTPATIENT
Start: 2024-01-12 | End: 2024-02-11

## 2024-01-12 RX ORDER — DICYCLOMINE HYDROCHLORIDE 10 MG/1
TAKE 1 TABLET BY MOUTH THREE TIMES DAILY AS NEEDED FOR 30 DAYS CAPSULE ORAL THREE TIMES A DAY
Qty: 30 | Refills: 3 | Status: DISCONTINUED | COMMUNITY

## 2024-01-12 RX ORDER — COLESEVELAM HYDROCHLORIDE 625 MG/1
3 TABLETS WITH MEALS TABLET, COATED ORAL TWICE A DAY
Qty: 540 | Refills: 3 | Status: ACTIVE | COMMUNITY

## 2024-01-12 RX ORDER — DICLOFENAC SODIUM 75 MG/1
1 TABLET AS NEEDED TABLET, DELAYED RELEASE ORAL TWICE A DAY
Status: DISCONTINUED | COMMUNITY

## 2024-01-12 RX ORDER — PROMETHAZINE HYDROCHLORIDE 12.5 MG/1
1 TABLET AS NEEDED TABLET ORAL
Status: DISCONTINUED | COMMUNITY

## 2024-01-12 RX ORDER — GABAPENTIN 100 MG/1
2 CAPSULE ORAL BID
Status: ACTIVE | COMMUNITY

## 2024-01-12 RX ORDER — COLESEVELAM HYDROCHLORIDE 625 MG/1
3 TABLETS WITH MEALS TABLET, COATED ORAL TWICE A DAY
Qty: 540 | Refills: 3

## 2024-01-12 NOTE — HPI-TODAY'S VISIT:
Pt reports that she is having some trouble with sudden diarrhea.  Pt reports that she is taking the welchol.  Pt report sthat she is taking 2 per day.  Pt reports that she will have 2- 3 formed stools.  Then has suddent onset diarrhea.  Pt reprot that taking the dexilant that did not have reflux or diarrhea.  10/28/21: Pt RTC with c/o persistent  acid reflux and nausea over past couple of weeks. Mostly occurs in mornings No improvement with omeprazole. Last Wednesday, she had LLQ pain and diarrhea. Seen by PCP this past Monday and was prescribed Cipro/Flagyl for suspected diverticulitis. Denies any further diarrhea. No hematochezia. She has poor appetite and has lost 9 lbs in past week. Takes Zofran prn, but needs refill.  11-4-21 Patient returns for f/u visit. Pt reports that she was seen in ED with abdoinal pain. Hx of mult colon polyps. Last c scope in 2020. hgb was normal. 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 Pt reports that a few weeks ago she started that she would have reflux. Pt reports that when she would eat she would have regurgitation. Pt reports that 2 weeks ago had constant nausea. Trying to eat food was terrible. Pt with pain in the LLQ region. Pt reports that she tried to take her nausea medications which did not help. Had diarrhea to start with. Pt reports urgent gas and flatulence. Had increased belching. Has had mucousy stools. Yellow and oily stools.  12-23-21 Pt reports having her physical with her pcp and voiced concern of nausea.  Pt reports that she took the first medication and felt sicker  Pt reports that she then took the nystatin.  Pt reports that she swallowed.  Patient reports that she is feeling better.   Pt reports that she went to Nutritionist and was recommended probiotic. Pt reports that she was given silver as well.  EUS with IPMN benign 1 year MRI recommended, low CEA. Colon with TA polyp.  Pt reports that she is taking diclofenac and has nausea.  Pt reports that she wanted to ask about taking welchol and imodium which has worked which has controlled the diarrhea. c/o inguinal pulsation and numbness (advised to speak to pcp)  1.12.24 Has been doing well with IBS-D for some time, does have chronic diarrhea as side effect from previous malignancy and therapy  Was doing well with Welchol for years, then has slowly needed to add Imodium as well daily.  last EGD/colon EUS in 2021: A digital rectal exam was performed which was remarkable for tone. Subsequently the colonoscope was inserted and passed under direct visualization to the anus. 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 2 small (2-3 mm) transverse colon polyps were removed with cold snare and biopsy forceps. The specimens were placed in Jar B. 1 small (3mm) descending colon polyp was removed with cold snare and placed in Jar C. Evidence of J-pouch creation was seen in the rectum and was unremarkable. The overall quality of the prep was adequate. The patient tolerated the procedure well. There were no immediate post-procedure complications.  Path with no pouchitis

## 2024-01-15 ENCOUNTER — TELEPHONE ENCOUNTER (OUTPATIENT)
Dept: URBAN - NONMETROPOLITAN AREA CLINIC 4 | Facility: CLINIC | Age: 84
End: 2024-01-15

## 2024-01-19 ENCOUNTER — TELEPHONE ENCOUNTER (OUTPATIENT)
Dept: URBAN - NONMETROPOLITAN AREA CLINIC 4 | Facility: CLINIC | Age: 84
End: 2024-01-19

## 2024-01-22 ENCOUNTER — TELEPHONE ENCOUNTER (OUTPATIENT)
Dept: URBAN - NONMETROPOLITAN AREA CLINIC 4 | Facility: CLINIC | Age: 84
End: 2024-01-22

## 2024-01-30 ENCOUNTER — DASHBOARD ENCOUNTERS (OUTPATIENT)
Age: 84
End: 2024-01-30

## 2024-01-30 ENCOUNTER — OFFICE VISIT (OUTPATIENT)
Dept: URBAN - NONMETROPOLITAN AREA CLINIC 4 | Facility: CLINIC | Age: 84
End: 2024-01-30
Payer: COMMERCIAL

## 2024-01-30 VITALS
WEIGHT: 158.4 LBS | DIASTOLIC BLOOD PRESSURE: 78 MMHG | HEART RATE: 77 BPM | BODY MASS INDEX: 25.46 KG/M2 | SYSTOLIC BLOOD PRESSURE: 152 MMHG | TEMPERATURE: 96.3 F | HEIGHT: 66 IN

## 2024-01-30 DIAGNOSIS — K63.5 COLON POLYPS: ICD-10-CM

## 2024-01-30 DIAGNOSIS — D49.0 IPMN (INTRADUCTAL PAPILLARY MUCINOUS NEOPLASM): ICD-10-CM

## 2024-01-30 DIAGNOSIS — K58.8 OTHER IRRITABLE BOWEL SYNDROME: ICD-10-CM

## 2024-01-30 PROCEDURE — 99213 OFFICE O/P EST LOW 20 MIN: CPT | Performed by: PHYSICIAN ASSISTANT

## 2024-01-30 RX ORDER — COLESEVELAM HYDROCHLORIDE 625 MG/1
3 TABLETS WITH MEALS TABLET, COATED ORAL TWICE A DAY
Qty: 540 | Refills: 3

## 2024-01-30 RX ORDER — DICYCLOMINE HYDROCHLORIDE 20 MG/1
1 TABLET TABLET ORAL THREE TIMES A DAY
Qty: 90 | Status: ACTIVE | COMMUNITY
Start: 2024-01-12 | End: 2024-02-11

## 2024-01-30 RX ORDER — DICYCLOMINE HYDROCHLORIDE 20 MG/1
1 TABLET TABLET ORAL THREE TIMES A DAY
Qty: 90 | OUTPATIENT

## 2024-01-30 RX ORDER — PANTOPRAZOLE SODIUM 40 MG/1
1 TABLET TABLET, DELAYED RELEASE ORAL ONCE A DAY
Qty: 90 | Refills: 2 | Status: ACTIVE | COMMUNITY

## 2024-01-30 RX ORDER — GABAPENTIN 100 MG/1
2 CAPSULE ORAL BID
Status: ACTIVE | COMMUNITY

## 2024-01-30 RX ORDER — ERGOCALCIFEROL 1.25 MG/1
1 CAPSULE CAPSULE, LIQUID FILLED ORAL
Status: ACTIVE | COMMUNITY

## 2024-01-30 RX ORDER — COLESEVELAM HYDROCHLORIDE 625 MG/1
3 TABLETS WITH MEALS TABLET, COATED ORAL TWICE A DAY
Qty: 540 | Refills: 3 | Status: ACTIVE | COMMUNITY

## 2024-01-30 NOTE — HPI-TODAY'S VISIT:
Pt reports that she is having some trouble with sudden diarrhea.  Pt reports that she is taking the welchol.  Pt report sthat she is taking 2 per day.  Pt reports that she will have 2- 3 formed stools.  Then has suddent onset diarrhea.  Pt reprot that taking the dexilant that did not have reflux or diarrhea.  10/28/21: Pt RTC with c/o persistent  acid reflux and nausea over past couple of weeks. Mostly occurs in mornings No improvement with omeprazole. Last Wednesday, she had LLQ pain and diarrhea. Seen by PCP this past Monday and was prescribed Cipro/Flagyl for suspected diverticulitis. Denies any further diarrhea. No hematochezia. She has poor appetite and has lost 9 lbs in past week. Takes Zofran prn, but needs refill.  11-4-21 Patient returns for f/u visit. Pt reports that she was seen in ED with abdoinal pain. Hx of mult colon polyps. Last c scope in 2020. hgb was normal. 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 Pt reports that a few weeks ago she started that she would have reflux. Pt reports that when she would eat she would have regurgitation. Pt reports that 2 weeks ago had constant nausea. Trying to eat food was terrible. Pt with pain in the LLQ region. Pt reports that she tried to take her nausea medications which did not help. Had diarrhea to start with. Pt reports urgent gas and flatulence. Had increased belching. Has had mucousy stools. Yellow and oily stools.  12-23-21 Pt reports having her physical with her pcp and voiced concern of nausea.  Pt reports that she took the first medication and felt sicker  Pt reports that she then took the nystatin.  Pt reports that she swallowed.  Patient reports that she is feeling better.   Pt reports that she went to Nutritionist and was recommended probiotic. Pt reports that she was given silver as well.  EUS with IPMN benign 1 year MRI recommended, low CEA. Colon with TA polyp.  Pt reports that she is taking diclofenac and has nausea.  Pt reports that she wanted to ask about taking welchol and imodium which has worked which has controlled the diarrhea. c/o inguinal pulsation and numbness (advised to speak to pcp)  1.12.24 Has been doing well with IBS-D for some time, does have chronic diarrhea as side effect from previous malignancy and therapy  Was doing well with Welchol for years, then has slowly needed to add Imodium as well daily.  last EGD/colon EUS in 2021: A digital rectal exam was performed which was remarkable for tone. Subsequently the colonoscope was inserted and passed under direct visualization to the anus. 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 2 small (2-3 mm) transverse colon polyps were removed with cold snare and biopsy forceps. The specimens were placed in Jar B. 1 small (3mm) descending colon polyp was removed with cold snare and placed in Jar C. Evidence of J-pouch creation was seen in the rectum and was unremarkable. The overall quality of the prep was adequate. The patient tolerated the procedure well. There were no immediate post-procedure complications.  Path with no pouchitis  1.30.24 unfortunately the patient was unable to get her Xifaxan due to cost her stools are intermittently loose, there may be several episodes of normal bowel movements but increased frequency then followed by looser stool  No blood and otherwise feeling well

## 2024-01-31 PROBLEM — 10743008 IRRITABLE BOWEL SYNDROME: Status: ACTIVE | Noted: 2021-02-04

## 2024-07-20 ENCOUNTER — P2P PATIENT RECORD (OUTPATIENT)
Age: 84
End: 2024-07-20

## 2024-12-30 ENCOUNTER — ERX REFILL RESPONSE (OUTPATIENT)
Dept: URBAN - NONMETROPOLITAN AREA CLINIC 4 | Facility: CLINIC | Age: 84
End: 2024-12-30

## 2024-12-30 RX ORDER — PANTOPRAZOLE SODIUM 40 MG/1
TAKE 1 TABLET BY MOUTH ONCE  DAILY TABLET, DELAYED RELEASE ORAL
Qty: 100 TABLET | Refills: 3 | OUTPATIENT

## 2024-12-30 RX ORDER — PANTOPRAZOLE SODIUM 40 MG/1
TAKE 1 TABLET BY MOUTH ONCE  DAILY TABLET, DELAYED RELEASE ORAL
Qty: 100 TABLET | Refills: 2 | OUTPATIENT

## 2025-02-19 ENCOUNTER — ERX REFILL RESPONSE (OUTPATIENT)
Dept: URBAN - NONMETROPOLITAN AREA CLINIC 4 | Facility: CLINIC | Age: 85
End: 2025-02-19

## 2025-02-19 RX ORDER — DICYCLOMINE HYDROCHLORIDE 20 MG/1
1 TABLET TABLET ORAL THREE TIMES A DAY
Qty: 90 | Refills: 3 | OUTPATIENT

## 2025-02-19 RX ORDER — DICYCLOMINE HYDROCHLORIDE 20 MG/1
1 TABLET TABLET ORAL THREE TIMES A DAY
Qty: 90 | OUTPATIENT

## 2025-03-28 ENCOUNTER — TELEPHONE ENCOUNTER (OUTPATIENT)
Dept: URBAN - NONMETROPOLITAN AREA CLINIC 4 | Facility: CLINIC | Age: 85
End: 2025-03-28

## 2025-03-28 RX ORDER — COLESEVELAM HYDROCHLORIDE 625 MG/1
3 TABLETS WITH MEALS TABLET, COATED ORAL TWICE A DAY
Qty: 540 | Refills: 3

## 2025-04-08 ENCOUNTER — TELEPHONE ENCOUNTER (OUTPATIENT)
Dept: URBAN - NONMETROPOLITAN AREA CLINIC 4 | Facility: CLINIC | Age: 85
End: 2025-04-08

## 2025-04-09 ENCOUNTER — TELEPHONE ENCOUNTER (OUTPATIENT)
Dept: URBAN - NONMETROPOLITAN AREA CLINIC 4 | Facility: CLINIC | Age: 85
End: 2025-04-09

## 2025-04-09 RX ORDER — COLESEVELAM HYDROCHLORIDE 625 MG/1
3 TABLETS WITH MEALS TABLET, COATED ORAL TWICE A DAY
Qty: 180 TABLET | Refills: 5 | OUTPATIENT